# Patient Record
Sex: MALE | Race: OTHER | NOT HISPANIC OR LATINO | URBAN - METROPOLITAN AREA
[De-identification: names, ages, dates, MRNs, and addresses within clinical notes are randomized per-mention and may not be internally consistent; named-entity substitution may affect disease eponyms.]

---

## 2018-12-11 ENCOUNTER — EMERGENCY (EMERGENCY)
Facility: HOSPITAL | Age: 38
LOS: 1 days | Discharge: HOME | End: 2018-12-11
Attending: EMERGENCY MEDICINE | Admitting: EMERGENCY MEDICINE
Payer: SELF-PAY

## 2018-12-11 VITALS
TEMPERATURE: 98 F | RESPIRATION RATE: 18 BRPM | HEART RATE: 72 BPM | OXYGEN SATURATION: 100 % | SYSTOLIC BLOOD PRESSURE: 164 MMHG | DIASTOLIC BLOOD PRESSURE: 75 MMHG

## 2018-12-11 DIAGNOSIS — R00.2 PALPITATIONS: ICD-10-CM

## 2018-12-11 DIAGNOSIS — R07.89 OTHER CHEST PAIN: ICD-10-CM

## 2018-12-11 PROCEDURE — 99053 MED SERV 10PM-8AM 24 HR FAC: CPT

## 2018-12-11 PROCEDURE — 99285 EMERGENCY DEPT VISIT HI MDM: CPT | Mod: 25

## 2018-12-11 NOTE — ED ADULT NURSE NOTE - OBJECTIVE STATEMENT
39 y/o male c/o palpitations and left chest pain. Pt denies SOB. pt reports pain and numbness radiating down left arm. Pt denies N/V or dizziness. Pt speaks clear, MAEx4, has unlabored breathing. Abd soft nt nd. Skin dry, warm.

## 2018-12-11 NOTE — ED ADULT NURSE NOTE - CHPI ED NUR SYMPTOMS NEG
no congestion/no diaphoresis/no nausea/no fever/no back pain/no syncope/no shortness of breath/no vomiting

## 2018-12-12 VITALS
RESPIRATION RATE: 16 BRPM | DIASTOLIC BLOOD PRESSURE: 82 MMHG | TEMPERATURE: 98 F | HEART RATE: 80 BPM | SYSTOLIC BLOOD PRESSURE: 142 MMHG | OXYGEN SATURATION: 99 %

## 2018-12-12 PROCEDURE — 83735 ASSAY OF MAGNESIUM: CPT

## 2018-12-12 PROCEDURE — 36415 COLL VENOUS BLD VENIPUNCTURE: CPT

## 2018-12-12 PROCEDURE — 99283 EMERGENCY DEPT VISIT LOW MDM: CPT

## 2018-12-12 PROCEDURE — 85610 PROTHROMBIN TIME: CPT

## 2018-12-12 PROCEDURE — 85730 THROMBOPLASTIN TIME PARTIAL: CPT

## 2018-12-12 PROCEDURE — 82553 CREATINE MB FRACTION: CPT

## 2018-12-12 PROCEDURE — 84484 ASSAY OF TROPONIN QUANT: CPT

## 2018-12-12 PROCEDURE — 80053 COMPREHEN METABOLIC PANEL: CPT

## 2018-12-12 PROCEDURE — 71046 X-RAY EXAM CHEST 2 VIEWS: CPT | Mod: 26

## 2018-12-12 PROCEDURE — 71046 X-RAY EXAM CHEST 2 VIEWS: CPT

## 2018-12-12 PROCEDURE — 83690 ASSAY OF LIPASE: CPT

## 2018-12-12 PROCEDURE — 82550 ASSAY OF CK (CPK): CPT

## 2018-12-12 PROCEDURE — 85025 COMPLETE CBC W/AUTO DIFF WBC: CPT

## 2018-12-12 NOTE — ED PROVIDER NOTE - OBJECTIVE STATEMENT
2 days intermittent palpitations and some CP reported no fever no SOB No meds Hx 2 days intermittent palpitations and some CP though primarily palpitations espoused with no reported fever no SOB No meds Hx

## 2018-12-12 NOTE — ED PROVIDER NOTE - MEDICAL DECISION MAKING DETAILS
counseled on cxr/ labs / ekg and shared decision making towards advanced imaging counseled on cxr/ labs / ekg and follow up outpatient for further evaluations and care

## 2018-12-12 NOTE — ED PROVIDER NOTE - ATTENDING CONTRIBUTION TO CARE
I have seen & examined the pt, reviewed all pertinent clinical data, and I agree with the documentation/care/plan executed by DOUG Cat.

## 2019-01-03 ENCOUNTER — EMERGENCY (EMERGENCY)
Facility: HOSPITAL | Age: 39
LOS: 1 days | Discharge: HOME | End: 2019-01-03
Attending: EMERGENCY MEDICINE | Admitting: EMERGENCY MEDICINE
Payer: SELF-PAY

## 2019-01-03 VITALS
WEIGHT: 188.05 LBS | RESPIRATION RATE: 20 BRPM | TEMPERATURE: 99 F | OXYGEN SATURATION: 99 % | SYSTOLIC BLOOD PRESSURE: 162 MMHG | DIASTOLIC BLOOD PRESSURE: 87 MMHG | HEART RATE: 85 BPM

## 2019-01-03 DIAGNOSIS — R00.2 PALPITATIONS: ICD-10-CM

## 2019-01-03 DIAGNOSIS — R06.02 SHORTNESS OF BREATH: ICD-10-CM

## 2019-01-03 DIAGNOSIS — R07.89 OTHER CHEST PAIN: ICD-10-CM

## 2019-01-03 LAB
ALBUMIN SERPL ELPH-MCNC: 4.3 G/DL — SIGNIFICANT CHANGE UP (ref 3.3–5)
ALP SERPL-CCNC: 51 U/L — SIGNIFICANT CHANGE UP (ref 40–120)
ALT FLD-CCNC: 26 U/L — SIGNIFICANT CHANGE UP (ref 10–45)
ANION GAP SERPL CALC-SCNC: 11 MMOL/L — SIGNIFICANT CHANGE UP (ref 5–17)
AST SERPL-CCNC: 26 U/L — SIGNIFICANT CHANGE UP (ref 10–40)
BASOPHILS NFR BLD AUTO: 0.2 % — SIGNIFICANT CHANGE UP (ref 0–2)
BILIRUB SERPL-MCNC: 0.4 MG/DL — SIGNIFICANT CHANGE UP (ref 0.2–1.2)
BUN SERPL-MCNC: 11 MG/DL — SIGNIFICANT CHANGE UP (ref 7–23)
CALCIUM SERPL-MCNC: 9.4 MG/DL — SIGNIFICANT CHANGE UP (ref 8.4–10.5)
CHLORIDE SERPL-SCNC: 104 MMOL/L — SIGNIFICANT CHANGE UP (ref 96–108)
CK MB CFR SERPL CALC: 1 NG/ML — SIGNIFICANT CHANGE UP (ref 0–6.7)
CK SERPL-CCNC: 131 U/L — SIGNIFICANT CHANGE UP (ref 30–200)
CO2 SERPL-SCNC: 28 MMOL/L — SIGNIFICANT CHANGE UP (ref 22–31)
CREAT SERPL-MCNC: 0.83 MG/DL — SIGNIFICANT CHANGE UP (ref 0.5–1.3)
D DIMER BLD IA.RAPID-MCNC: <150 NG/ML DDU — SIGNIFICANT CHANGE UP
EOSINOPHIL NFR BLD AUTO: 1.7 % — SIGNIFICANT CHANGE UP (ref 0–6)
GLUCOSE SERPL-MCNC: 107 MG/DL — HIGH (ref 70–99)
HCT VFR BLD CALC: 42.9 % — SIGNIFICANT CHANGE UP (ref 39–50)
HGB BLD-MCNC: 14.1 G/DL — SIGNIFICANT CHANGE UP (ref 13–17)
LIDOCAIN IGE QN: 62 U/L — HIGH (ref 7–60)
LYMPHOCYTES # BLD AUTO: 28.9 % — SIGNIFICANT CHANGE UP (ref 13–44)
MAGNESIUM SERPL-MCNC: 2 MG/DL — SIGNIFICANT CHANGE UP (ref 1.6–2.6)
MCHC RBC-ENTMCNC: 31.5 PG — SIGNIFICANT CHANGE UP (ref 27–34)
MCHC RBC-ENTMCNC: 32.9 G/DL — SIGNIFICANT CHANGE UP (ref 32–36)
MCV RBC AUTO: 95.8 FL — SIGNIFICANT CHANGE UP (ref 80–100)
MONOCYTES NFR BLD AUTO: 8 % — SIGNIFICANT CHANGE UP (ref 2–14)
NEUTROPHILS NFR BLD AUTO: 61.2 % — SIGNIFICANT CHANGE UP (ref 43–77)
PLATELET # BLD AUTO: 194 K/UL — SIGNIFICANT CHANGE UP (ref 150–400)
POTASSIUM SERPL-MCNC: 4.1 MMOL/L — SIGNIFICANT CHANGE UP (ref 3.5–5.3)
POTASSIUM SERPL-SCNC: 4.1 MMOL/L — SIGNIFICANT CHANGE UP (ref 3.5–5.3)
PROT SERPL-MCNC: 7.7 G/DL — SIGNIFICANT CHANGE UP (ref 6–8.3)
RBC # BLD: 4.48 M/UL — SIGNIFICANT CHANGE UP (ref 4.2–5.8)
RBC # FLD: 12.8 % — SIGNIFICANT CHANGE UP (ref 10.3–16.9)
SODIUM SERPL-SCNC: 143 MMOL/L — SIGNIFICANT CHANGE UP (ref 135–145)
TROPONIN T SERPL-MCNC: <0.01 NG/ML — SIGNIFICANT CHANGE UP (ref 0–0.01)
WBC # BLD: 6.4 K/UL — SIGNIFICANT CHANGE UP (ref 3.8–10.5)
WBC # FLD AUTO: 6.4 K/UL — SIGNIFICANT CHANGE UP (ref 3.8–10.5)

## 2019-01-03 PROCEDURE — 99284 EMERGENCY DEPT VISIT MOD MDM: CPT | Mod: 25

## 2019-01-03 PROCEDURE — 93010 ELECTROCARDIOGRAM REPORT: CPT

## 2019-01-03 NOTE — ED PROVIDER NOTE - OBJECTIVE STATEMENT
reported 1 week palpitations chest pain and sob + hx similar symptoms in past albeit no w/u to date Denies fever

## 2019-01-03 NOTE — ED ADULT NURSE NOTE - NSIMPLEMENTINTERV_GEN_ALL_ED
Implemented All Universal Safety Interventions:  [place] to call system. Call bell, personal items and telephone within reach. Instruct patient to call for assistance. Room bathroom lighting operational. Non-slip footwear when patient is off stretcher. Physically safe environment: no spills, clutter or unnecessary equipment. Stretcher in lowest position, wheels locked, appropriate side rails in place.

## 2019-01-03 NOTE — ED PROVIDER NOTE - ATTENDING CONTRIBUTION TO CARE
38  yom pw 1 wk of cp w/ palpitation.  has had similar sx in the past.  no abd pain, no syncope.    agree w/ PA, nontoxic appearing, no tachypnea, no resp distress, will check labs, dimer for low risk PE eval, low suspicion for acs

## 2019-01-03 NOTE — ED PROVIDER NOTE - CARE PROVIDER_API CALL
Eb Wu), Cardiovascular Disease; Internal Medicine  [address] [facility]  [address] [facility] [place] [place] [address]  Phone: [phone]  Fax: [phone]

## 2019-01-04 PROCEDURE — 82550 ASSAY OF CK (CPK): CPT

## 2019-01-04 PROCEDURE — 83690 ASSAY OF LIPASE: CPT

## 2019-01-04 PROCEDURE — 36415 COLL VENOUS BLD VENIPUNCTURE: CPT

## 2019-01-04 PROCEDURE — 94640 AIRWAY INHALATION TREATMENT: CPT

## 2019-01-04 PROCEDURE — 82553 CREATINE MB FRACTION: CPT

## 2019-01-04 PROCEDURE — 85379 FIBRIN DEGRADATION QUANT: CPT

## 2019-01-04 PROCEDURE — 80053 COMPREHEN METABOLIC PANEL: CPT

## 2019-01-04 PROCEDURE — 99283 EMERGENCY DEPT VISIT LOW MDM: CPT | Mod: 25

## 2019-01-04 PROCEDURE — 83735 ASSAY OF MAGNESIUM: CPT

## 2019-01-04 PROCEDURE — 93005 ELECTROCARDIOGRAM TRACING: CPT

## 2019-01-04 PROCEDURE — 85025 COMPLETE CBC W/AUTO DIFF WBC: CPT

## 2019-01-04 PROCEDURE — 84484 ASSAY OF TROPONIN QUANT: CPT

## 2019-01-04 RX ORDER — ALBUTEROL 90 UG/1
1 AEROSOL, METERED ORAL ONCE
Qty: 0 | Refills: 0 | Status: COMPLETED | OUTPATIENT
Start: 2019-01-04 | End: 2019-01-04

## 2019-01-04 RX ADMIN — ALBUTEROL 1 PUFF(S): 90 AEROSOL, METERED ORAL at 00:05

## 2019-02-05 NOTE — ED ADULT TRIAGE NOTE - WEIGHT IN KG
34 85.3

## 2019-07-25 ENCOUNTER — EMERGENCY (EMERGENCY)
Facility: HOSPITAL | Age: 39
LOS: 1 days | Discharge: HOME | End: 2019-07-25
Attending: EMERGENCY MEDICINE | Admitting: EMERGENCY MEDICINE
Payer: SELF-PAY

## 2019-07-25 VITALS
SYSTOLIC BLOOD PRESSURE: 132 MMHG | WEIGHT: 188.05 LBS | RESPIRATION RATE: 19 BRPM | DIASTOLIC BLOOD PRESSURE: 83 MMHG | OXYGEN SATURATION: 99 % | HEIGHT: 77 IN | TEMPERATURE: 98 F | HEART RATE: 69 BPM

## 2019-07-25 DIAGNOSIS — R42 DIZZINESS AND GIDDINESS: ICD-10-CM

## 2019-07-25 DIAGNOSIS — R11.0 NAUSEA: ICD-10-CM

## 2019-07-25 DIAGNOSIS — R51 HEADACHE: ICD-10-CM

## 2019-07-25 LAB
ALBUMIN SERPL ELPH-MCNC: 4.5 G/DL — SIGNIFICANT CHANGE UP (ref 3.3–5)
ALP SERPL-CCNC: 55 U/L — SIGNIFICANT CHANGE UP (ref 40–120)
ALT FLD-CCNC: 15 U/L — SIGNIFICANT CHANGE UP (ref 10–45)
ANION GAP SERPL CALC-SCNC: 9 MMOL/L — SIGNIFICANT CHANGE UP (ref 5–17)
AST SERPL-CCNC: 20 U/L — SIGNIFICANT CHANGE UP (ref 10–40)
BASOPHILS # BLD AUTO: 0.04 K/UL — SIGNIFICANT CHANGE UP (ref 0–0.2)
BASOPHILS NFR BLD AUTO: 0.6 % — SIGNIFICANT CHANGE UP (ref 0–2)
BILIRUB SERPL-MCNC: 0.3 MG/DL — SIGNIFICANT CHANGE UP (ref 0.2–1.2)
BUN SERPL-MCNC: 11 MG/DL — SIGNIFICANT CHANGE UP (ref 7–23)
CALCIUM SERPL-MCNC: 9.7 MG/DL — SIGNIFICANT CHANGE UP (ref 8.4–10.5)
CHLORIDE SERPL-SCNC: 106 MMOL/L — SIGNIFICANT CHANGE UP (ref 96–108)
CK MB CFR SERPL CALC: <1 NG/ML — SIGNIFICANT CHANGE UP (ref 0–6.7)
CK SERPL-CCNC: 108 U/L — SIGNIFICANT CHANGE UP (ref 30–200)
CO2 SERPL-SCNC: 29 MMOL/L — SIGNIFICANT CHANGE UP (ref 22–31)
CREAT SERPL-MCNC: 0.92 MG/DL — SIGNIFICANT CHANGE UP (ref 0.5–1.3)
EOSINOPHIL # BLD AUTO: 0.24 K/UL — SIGNIFICANT CHANGE UP (ref 0–0.5)
EOSINOPHIL NFR BLD AUTO: 3.3 % — SIGNIFICANT CHANGE UP (ref 0–6)
GLUCOSE SERPL-MCNC: 98 MG/DL — SIGNIFICANT CHANGE UP (ref 70–99)
HCT VFR BLD CALC: 48.2 % — SIGNIFICANT CHANGE UP (ref 39–50)
HGB BLD-MCNC: 15.8 G/DL — SIGNIFICANT CHANGE UP (ref 13–17)
IMM GRANULOCYTES NFR BLD AUTO: 0.3 % — SIGNIFICANT CHANGE UP (ref 0–1.5)
LYMPHOCYTES # BLD AUTO: 2.51 K/UL — SIGNIFICANT CHANGE UP (ref 1–3.3)
LYMPHOCYTES # BLD AUTO: 35 % — SIGNIFICANT CHANGE UP (ref 13–44)
MCHC RBC-ENTMCNC: 32.1 PG — SIGNIFICANT CHANGE UP (ref 27–34)
MCHC RBC-ENTMCNC: 32.8 GM/DL — SIGNIFICANT CHANGE UP (ref 32–36)
MCV RBC AUTO: 98 FL — SIGNIFICANT CHANGE UP (ref 80–100)
MONOCYTES # BLD AUTO: 0.43 K/UL — SIGNIFICANT CHANGE UP (ref 0–0.9)
MONOCYTES NFR BLD AUTO: 6 % — SIGNIFICANT CHANGE UP (ref 2–14)
NEUTROPHILS # BLD AUTO: 3.94 K/UL — SIGNIFICANT CHANGE UP (ref 1.8–7.4)
NEUTROPHILS NFR BLD AUTO: 54.8 % — SIGNIFICANT CHANGE UP (ref 43–77)
NRBC # BLD: 0 /100 WBCS — SIGNIFICANT CHANGE UP (ref 0–0)
PLATELET # BLD AUTO: 224 K/UL — SIGNIFICANT CHANGE UP (ref 150–400)
POTASSIUM SERPL-MCNC: 4.1 MMOL/L — SIGNIFICANT CHANGE UP (ref 3.5–5.3)
POTASSIUM SERPL-SCNC: 4.1 MMOL/L — SIGNIFICANT CHANGE UP (ref 3.5–5.3)
PROT SERPL-MCNC: 8 G/DL — SIGNIFICANT CHANGE UP (ref 6–8.3)
RBC # BLD: 4.92 M/UL — SIGNIFICANT CHANGE UP (ref 4.2–5.8)
RBC # FLD: 12.3 % — SIGNIFICANT CHANGE UP (ref 10.3–14.5)
SODIUM SERPL-SCNC: 144 MMOL/L — SIGNIFICANT CHANGE UP (ref 135–145)
TROPONIN T SERPL-MCNC: <0.01 NG/ML — SIGNIFICANT CHANGE UP (ref 0–0.01)
WBC # BLD: 7.18 K/UL — SIGNIFICANT CHANGE UP (ref 3.8–10.5)
WBC # FLD AUTO: 7.18 K/UL — SIGNIFICANT CHANGE UP (ref 3.8–10.5)

## 2019-07-25 PROCEDURE — 93005 ELECTROCARDIOGRAM TRACING: CPT

## 2019-07-25 PROCEDURE — 82550 ASSAY OF CK (CPK): CPT

## 2019-07-25 PROCEDURE — 80053 COMPREHEN METABOLIC PANEL: CPT

## 2019-07-25 PROCEDURE — 83735 ASSAY OF MAGNESIUM: CPT

## 2019-07-25 PROCEDURE — 36415 COLL VENOUS BLD VENIPUNCTURE: CPT

## 2019-07-25 PROCEDURE — 85025 COMPLETE CBC W/AUTO DIFF WBC: CPT

## 2019-07-25 PROCEDURE — 84100 ASSAY OF PHOSPHORUS: CPT

## 2019-07-25 PROCEDURE — 82553 CREATINE MB FRACTION: CPT

## 2019-07-25 PROCEDURE — 93010 ELECTROCARDIOGRAM REPORT: CPT

## 2019-07-25 PROCEDURE — 99283 EMERGENCY DEPT VISIT LOW MDM: CPT | Mod: 25

## 2019-07-25 PROCEDURE — 84484 ASSAY OF TROPONIN QUANT: CPT

## 2019-07-25 PROCEDURE — 99284 EMERGENCY DEPT VISIT MOD MDM: CPT

## 2019-07-25 RX ORDER — SODIUM CHLORIDE 9 MG/ML
2000 INJECTION INTRAMUSCULAR; INTRAVENOUS; SUBCUTANEOUS ONCE
Refills: 0 | Status: COMPLETED | OUTPATIENT
Start: 2019-07-25 | End: 2019-07-25

## 2019-07-25 RX ADMIN — SODIUM CHLORIDE 2000 MILLILITER(S): 9 INJECTION INTRAMUSCULAR; INTRAVENOUS; SUBCUTANEOUS at 23:37

## 2019-07-25 NOTE — ED ADULT NURSE NOTE - OBJECTIVE STATEMENT
Pt alert and oriented X3, Pt states he has been feeling dizzy intermittently along with SOB since Friday more prominent when he is performing any activities. Pt also reports feeling headaches intermittently along with "feeling pressure in my ears."

## 2019-07-25 NOTE — ED ADULT NURSE NOTE - NEURO WDL
Alert and oriented to person, place and time, memory intact, behavior appropriate to situation. See note above. No neuro deficits

## 2019-07-25 NOTE — ED PROVIDER NOTE - CLINICAL SUMMARY MEDICAL DECISION MAKING FREE TEXT BOX
pt w dizzyness, normal exam and reassuring vitals , HIGHEST suspician for dehydration / lightheadness as pt w dark yellow colored urine at bedside. Consider vertigo, pt with nl neuro exam, doubt intracranial pathology, plan to screen EKG , eval for metabolic abnormalities and hydrate, IF still dizzy/lightheaded after fluids will empirically try meclizine for possible vertigo.  dispo plan pending workup.

## 2019-07-25 NOTE — ED ADULT NURSE NOTE - CHPI ED NUR SYMPTOMS NEG
no blurred vision/no weakness/no nausea/no numbness/no confusion/no fever/no change in level of consciousness/no loss of consciousness/no vomiting

## 2019-07-25 NOTE — ED PROVIDER NOTE - OBJECTIVE STATEMENT
39 yo with ho of asthma, reports 5 days ago , on the day it was in the 90's, reports he felt lightheaded and nauseaus , had an episode of diaphoresis w the sensation he might faint, had skipped breakfast that morning and reports he wasn't drinking much water,  later developed gradual onset headache, reports headache lasted for 24 hrs , since event pt reports he has felt dizzyness , similar to after a prior concussion, no persistent headaches, no fever, no neck pain, no CP no SOB, no palpitations,

## 2019-07-26 VITALS
RESPIRATION RATE: 18 BRPM | SYSTOLIC BLOOD PRESSURE: 119 MMHG | OXYGEN SATURATION: 98 % | HEART RATE: 72 BPM | DIASTOLIC BLOOD PRESSURE: 76 MMHG

## 2019-07-26 PROBLEM — J45.909 UNSPECIFIED ASTHMA, UNCOMPLICATED: Chronic | Status: ACTIVE | Noted: 2019-01-03

## 2020-04-20 ENCOUNTER — EMERGENCY (EMERGENCY)
Facility: HOSPITAL | Age: 40
LOS: 1 days | Discharge: HOME | End: 2020-04-20
Attending: EMERGENCY MEDICINE | Admitting: EMERGENCY MEDICINE
Payer: MEDICARE

## 2020-04-20 VITALS
RESPIRATION RATE: 18 BRPM | OXYGEN SATURATION: 99 % | SYSTOLIC BLOOD PRESSURE: 134 MMHG | HEART RATE: 90 BPM | DIASTOLIC BLOOD PRESSURE: 74 MMHG

## 2020-04-20 VITALS
DIASTOLIC BLOOD PRESSURE: 86 MMHG | WEIGHT: 184.97 LBS | OXYGEN SATURATION: 100 % | SYSTOLIC BLOOD PRESSURE: 145 MMHG | HEART RATE: 110 BPM | RESPIRATION RATE: 18 BRPM | TEMPERATURE: 98 F | HEIGHT: 77 IN

## 2020-04-20 LAB
ALBUMIN SERPL ELPH-MCNC: 4.1 G/DL — SIGNIFICANT CHANGE UP (ref 3.3–5)
ALP SERPL-CCNC: 49 U/L — SIGNIFICANT CHANGE UP (ref 40–120)
ALT FLD-CCNC: 12 U/L — SIGNIFICANT CHANGE UP (ref 10–45)
ANION GAP SERPL CALC-SCNC: 12 MMOL/L — SIGNIFICANT CHANGE UP (ref 5–17)
APTT BLD: 30.5 SEC — SIGNIFICANT CHANGE UP (ref 27.5–36.3)
AST SERPL-CCNC: 14 U/L — SIGNIFICANT CHANGE UP (ref 10–40)
BASOPHILS # BLD AUTO: 0.02 K/UL — SIGNIFICANT CHANGE UP (ref 0–0.2)
BASOPHILS NFR BLD AUTO: 0.2 % — SIGNIFICANT CHANGE UP (ref 0–2)
BILIRUB SERPL-MCNC: 0.5 MG/DL — SIGNIFICANT CHANGE UP (ref 0.2–1.2)
BUN SERPL-MCNC: 7 MG/DL — SIGNIFICANT CHANGE UP (ref 7–23)
CALCIUM SERPL-MCNC: 9.4 MG/DL — SIGNIFICANT CHANGE UP (ref 8.4–10.5)
CHLORIDE SERPL-SCNC: 103 MMOL/L — SIGNIFICANT CHANGE UP (ref 96–108)
CO2 SERPL-SCNC: 28 MMOL/L — SIGNIFICANT CHANGE UP (ref 22–31)
CREAT SERPL-MCNC: 0.77 MG/DL — SIGNIFICANT CHANGE UP (ref 0.5–1.3)
EOSINOPHIL # BLD AUTO: 0.01 K/UL — SIGNIFICANT CHANGE UP (ref 0–0.5)
EOSINOPHIL NFR BLD AUTO: 0.1 % — SIGNIFICANT CHANGE UP (ref 0–6)
GLUCOSE SERPL-MCNC: 107 MG/DL — HIGH (ref 70–99)
HCT VFR BLD CALC: 42.7 % — SIGNIFICANT CHANGE UP (ref 39–50)
HGB BLD-MCNC: 14.1 G/DL — SIGNIFICANT CHANGE UP (ref 13–17)
IMM GRANULOCYTES NFR BLD AUTO: 0.2 % — SIGNIFICANT CHANGE UP (ref 0–1.5)
INR BLD: 1.1 — SIGNIFICANT CHANGE UP (ref 0.88–1.16)
LYMPHOCYTES # BLD AUTO: 2.68 K/UL — SIGNIFICANT CHANGE UP (ref 1–3.3)
LYMPHOCYTES # BLD AUTO: 31 % — SIGNIFICANT CHANGE UP (ref 13–44)
MCHC RBC-ENTMCNC: 31.9 PG — SIGNIFICANT CHANGE UP (ref 27–34)
MCHC RBC-ENTMCNC: 33 GM/DL — SIGNIFICANT CHANGE UP (ref 32–36)
MCV RBC AUTO: 96.6 FL — SIGNIFICANT CHANGE UP (ref 80–100)
MONOCYTES # BLD AUTO: 0.52 K/UL — SIGNIFICANT CHANGE UP (ref 0–0.9)
MONOCYTES NFR BLD AUTO: 6 % — SIGNIFICANT CHANGE UP (ref 2–14)
NEUTROPHILS # BLD AUTO: 5.39 K/UL — SIGNIFICANT CHANGE UP (ref 1.8–7.4)
NEUTROPHILS NFR BLD AUTO: 62.5 % — SIGNIFICANT CHANGE UP (ref 43–77)
NRBC # BLD: 0 /100 WBCS — SIGNIFICANT CHANGE UP (ref 0–0)
PLATELET # BLD AUTO: 225 K/UL — SIGNIFICANT CHANGE UP (ref 150–400)
POTASSIUM SERPL-MCNC: 3 MMOL/L — LOW (ref 3.5–5.3)
POTASSIUM SERPL-SCNC: 3 MMOL/L — LOW (ref 3.5–5.3)
PROT SERPL-MCNC: 6.8 G/DL — SIGNIFICANT CHANGE UP (ref 6–8.3)
PROTHROM AB SERPL-ACNC: 12.6 SEC — SIGNIFICANT CHANGE UP (ref 10–12.9)
RBC # BLD: 4.42 M/UL — SIGNIFICANT CHANGE UP (ref 4.2–5.8)
RBC # FLD: 12.4 % — SIGNIFICANT CHANGE UP (ref 10.3–14.5)
SARS-COV-2 RNA SPEC QL NAA+PROBE: SIGNIFICANT CHANGE UP
SODIUM SERPL-SCNC: 143 MMOL/L — SIGNIFICANT CHANGE UP (ref 135–145)
TROPONIN T SERPL-MCNC: <0.01 NG/ML — SIGNIFICANT CHANGE UP (ref 0–0.01)
WBC # BLD: 8.64 K/UL — SIGNIFICANT CHANGE UP (ref 3.8–10.5)
WBC # FLD AUTO: 8.64 K/UL — SIGNIFICANT CHANGE UP (ref 3.8–10.5)

## 2020-04-20 PROCEDURE — 85025 COMPLETE CBC W/AUTO DIFF WBC: CPT

## 2020-04-20 PROCEDURE — 84484 ASSAY OF TROPONIN QUANT: CPT

## 2020-04-20 PROCEDURE — 71045 X-RAY EXAM CHEST 1 VIEW: CPT | Mod: 26

## 2020-04-20 PROCEDURE — 83880 ASSAY OF NATRIURETIC PEPTIDE: CPT

## 2020-04-20 PROCEDURE — 71045 X-RAY EXAM CHEST 1 VIEW: CPT

## 2020-04-20 PROCEDURE — 80053 COMPREHEN METABOLIC PANEL: CPT

## 2020-04-20 PROCEDURE — 93005 ELECTROCARDIOGRAM TRACING: CPT

## 2020-04-20 PROCEDURE — 93010 ELECTROCARDIOGRAM REPORT: CPT

## 2020-04-20 PROCEDURE — 85610 PROTHROMBIN TIME: CPT

## 2020-04-20 PROCEDURE — 87635 SARS-COV-2 COVID-19 AMP PRB: CPT

## 2020-04-20 PROCEDURE — 36415 COLL VENOUS BLD VENIPUNCTURE: CPT

## 2020-04-20 PROCEDURE — 99285 EMERGENCY DEPT VISIT HI MDM: CPT

## 2020-04-20 PROCEDURE — 99283 EMERGENCY DEPT VISIT LOW MDM: CPT | Mod: 25

## 2020-04-20 PROCEDURE — 85730 THROMBOPLASTIN TIME PARTIAL: CPT

## 2020-04-20 PROCEDURE — 85379 FIBRIN DEGRADATION QUANT: CPT

## 2020-04-20 RX ORDER — POTASSIUM CHLORIDE 20 MEQ
40 PACKET (EA) ORAL ONCE
Refills: 0 | Status: COMPLETED | OUTPATIENT
Start: 2020-04-20 | End: 2020-04-20

## 2020-04-20 RX ADMIN — Medication 40 MILLIEQUIVALENT(S): at 14:19

## 2020-04-20 NOTE — ED PROVIDER NOTE - ATTENDING CONTRIBUTION TO CARE
I discussed the plan of care of the patient directly with the PA and examined the patient while in the Emergency Department. I agree with the HPI and PE as documented by the PA.  Pt is a 38yo m, no pmh, who p/w 5 wks of intermittent cp and sob. CP described as aching to mid chest, non-exertional, better when lying on sides, non-pleuritic. No leg pain, swelling. No cough/ congestion/ fever/ chills. No abd pain, vomiting/ diarrhea. Pt was seen at [facility] 2 wks ago and told to have abnl ekg. Afebrile. HDS. Thin, AA m in nad. + s1,s2, rrr. Lungs cta b/l. Abd soft, nt/nd. No leg edema. No calf tenderness/ cords.2+ pulses b/l. EKG non-ischemic in ed. Plan for labs including cardiac enzymes and d-dimer, cxr. Will continue to monitor.

## 2020-04-20 NOTE — ED PROVIDER NOTE - CLINICAL SUMMARY MEDICAL DECISION MAKING FREE TEXT BOX
cp and sob x 5 wks. pt well appearing. labs noted. cxr no acute pathology. covid negative. troponin, d dimer and bnp negative. pt with similar visits in past. will refer to cardiology.

## 2020-04-20 NOTE — ED PROVIDER NOTE - NSFOLLOWUPINSTRUCTIONS_ED_ALL_ED_FT
Follow up with your cardiology this week.       Chest Pain    WHAT YOU NEED TO KNOW:    Chest pain can be caused by a range of conditions, from not serious to life-threatening. Chest pain can be a symptom of a digestive problem, such as acid reflux or a stomach ulcer. An anxiety attack or a strong emotion, such as anger, can also cause chest pain. Infection, inflammation, or a fracture in the bones or cartilage in your chest can cause pain or discomfort. Sometimes chest pain or pressure is caused by poor blood flow to your heart (angina). Chest pain may also be caused by life-threatening conditions such as a heart attack or blood clot in your lungs.     DISCHARGE INSTRUCTIONS:    Call 911 if:     You have any of the following signs of a heart attack:   Squeezing, pressure, or pain in your chest      You may also have any of the following:   Discomfort or pain in your back, neck, jaw, stomach, or arm      Shortness of breath      Nausea or vomiting      Lightheadedness or a sudden cold sweat        Return to the emergency department if:     You have chest discomfort that gets worse, even with medicine.      You cough or vomit blood.       Your bowel movements are black or bloody.       You cannot stop vomiting, or it hurts to swallow.     Contact your healthcare provider if:     You have questions or concerns about your condition or care.        Medicines:     Medicines may be given to treat the cause of your chest pain. Examples include pain medicine, anxiety medicine, or medicines to increase blood flow to your heart.       Do not take certain medicines without asking your healthcare provider first. These include NSAIDs, herbal or vitamin supplements, or hormones (estrogen or progestin).       Take your medicine as directed. Contact your healthcare provider if you think your medicine is not helping or if you have side effects. Tell him or her if you are allergic to any medicine. Keep a list of the medicines, vitamins, and herbs you take. Include the amounts, and when and why you take them. Bring the list or the pill bottles to follow-up visits. Carry your medicine list with you in case of an emergency.    Follow up with your healthcare provider within 72 hours, or as directed: You may need to return for more tests to find the cause of your chest pain. You may be referred to a specialist, such as a cardiologist or gastroenterologist. Write down your questions so you remember to ask them during your visits.     Healthy living tips: The following are general healthy guidelines. If your chest pain is caused by a heart problem, your healthcare provider will give you specific guidelines to follow.    Do not smoke. Nicotine and other chemicals in cigarettes and cigars can cause lung and heart damage. Ask your healthcare provider for information if you currently smoke and need help to quit. E-cigarettes or smokeless tobacco still contain nicotine. Talk to your healthcare provider before you use these products.       Eat a variety of healthy, low-fat, low-salt foods. Healthy foods include fruits, vegetables, whole-grain breads, low-fat dairy products, beans, lean meats, and fish. Ask for more information about a heart healthy diet.      Drink plenty of water every day. Your body is made of mostly water. Water helps your body to control your temperature and blood pressure. Ask your healthcare provider how much water you should drink every day.      Ask about activity. Your healthcare provider will tell you which activities to limit or avoid. Ask when you can drive, return to work, and have sex. Ask about the best exercise plan for you.      Maintain a healthy weight. Ask your healthcare provider how much you should weigh. Ask him or her to help you create a weight loss plan if you are overweight.       Get the flu and pneumonia vaccines. All adults should get the influenza (flu) vaccine. Get it every year as soon as it becomes available. The pneumococcal vaccine is given to adults aged 65 years or older. The vaccine is given every 5 years to prevent pneumococcal disease, such as pneumonia.    If you have a stent:     Carry your stent card with you at all times.       Let all healthcare providers know that you have a stent.          © Copyright [employer] 2020       back to top                      © Copyright [employer] 2020

## 2020-04-20 NOTE — ED ADULT NURSE NOTE - OBJECTIVE STATEMENT
39 y.o M a&ox4 walked in from triage c.o CP. Pt reports x 5 weeks of chest congestion, headaches, dizziness and SOB w/ exertion. SOB worsening when lying down at night. no swelling to lower extremities.  went to urgent care x 32 weeks ago and had an abnormal EKG.  came in today due to inability to sleep last night due to SOB. speaking in clear appropriate sentences airway patent. no signs of resp distress. 100% on room air. EKG complete. PMH of asthma , reports no relief after inhaler use. denies fevers, chills, body aches, diarrhea, loss of taste.

## 2020-04-20 NOTE — ED ADULT TRIAGE NOTE - CHIEF COMPLAINT QUOTE
c/o chest pain and sob x 1 month. states he had an abnormal ekg 2 weeks ago. presents today because he thought it would get better but it hasn't. endorses difficulty breathing worse with exertion or laying down. also states he had exposure to covid19 5 weeks ago but unsure if he had symptoms.

## 2020-04-20 NOTE — ED PROVIDER NOTE - CARE PROVIDER_API CALL
Angela Rodriguez)  Cardiovascular Disease; Internal Medicine  [address] [place] [address]  Phone: [phone]  Fax: [phone]  Follow Up Time: 4-6 Days

## 2020-04-20 NOTE — ED PROVIDER NOTE - PATIENT PORTAL LINK FT
You can access the FollowMyHealth Patient Portal offered by [facility] by registering at the following website: [email or web address]. By joining [employer]’s FollowMyHealth portal, you will also be able to view your health information using other applications (apps) compatible with our system.

## 2020-04-20 NOTE — ED PROVIDER NOTE - OBJECTIVE STATEMENT
38 y/o male with no sig PMHx c/o cp and sob x 5 wks. Pt states sob worse with lying flat. Pt reports intermittent dull achy pain to left chest. pt states seen at urgent care a few days ago and told to come to ED as his ECG showed " strain to left heart". Also pt notes left lower back pain and concerned may be his "kidney". no urinary sx's. no testicular pain. no leg pain or swelling. no ha or dizziness.  no abd pain, n/v. no further complaints.

## 2020-04-24 DIAGNOSIS — R07.89 OTHER CHEST PAIN: ICD-10-CM

## 2020-04-24 DIAGNOSIS — Z20.828 CONTACT WITH AND (SUSPECTED) EXPOSURE TO OTHER VIRAL COMMUNICABLE DISEASES: ICD-10-CM

## 2020-04-24 DIAGNOSIS — R06.02 SHORTNESS OF BREATH: ICD-10-CM

## 2020-05-07 ENCOUNTER — TRANSCRIPTION ENCOUNTER (OUTPATIENT)
Age: 40
End: 2020-05-07

## 2020-05-07 ENCOUNTER — APPOINTMENT (OUTPATIENT)
Dept: HEART AND VASCULAR | Facility: CLINIC | Age: 40
End: 2020-05-07
Payer: MEDICAID

## 2020-05-07 VITALS
DIASTOLIC BLOOD PRESSURE: 80 MMHG | WEIGHT: 184.99 LBS | OXYGEN SATURATION: 98 % | HEIGHT: 77.17 IN | TEMPERATURE: 97.9 F | BODY MASS INDEX: 21.84 KG/M2 | SYSTOLIC BLOOD PRESSURE: 110 MMHG | HEART RATE: 72 BPM

## 2020-05-07 DIAGNOSIS — F17.200 NICOTINE DEPENDENCE, UNSPECIFIED, UNCOMPLICATED: ICD-10-CM

## 2020-05-07 DIAGNOSIS — Z82.49 FAMILY HISTORY OF ISCHEMIC HEART DISEASE AND OTHER DISEASES OF THE CIRCULATORY SYSTEM: ICD-10-CM

## 2020-05-07 PROCEDURE — 99205 OFFICE O/P NEW HI 60 MIN: CPT

## 2020-05-07 PROCEDURE — 93306 TTE W/DOPPLER COMPLETE: CPT

## 2020-05-07 PROCEDURE — 93000 ELECTROCARDIOGRAM COMPLETE: CPT

## 2020-05-07 RX ORDER — CETIRIZINE HCL 10 MG
TABLET ORAL
Refills: 0 | Status: ACTIVE | COMMUNITY

## 2020-05-07 RX ORDER — ALBUTEROL SULFATE 90 UG/1
108 (90 BASE) INHALANT RESPIRATORY (INHALATION)
Refills: 0 | Status: ACTIVE | COMMUNITY

## 2020-05-07 NOTE — DISCUSSION/SUMMARY
[ECG Normal Variant] : ECG normal variant [Coronary Artery Disease] : coronary artery disease [Serum Electrolytes] : serum electrolytes [Possible Cardiac Ischemia (Intermd Prob)] : possible cardiac ischemia (intermediate probability) [Deteriorating] : deteriorating [Not Responding to Treatment] : not responding to treatment [Multidetector Cardiac CT] : multidetector cardiac CT [Hypertension] : hypertension [Stable] : stable [Outpatient Evaluation] : outpatient evaluation [Basic Metabolic Panel] : basic metabolic panel [Ambulatory BP Monitoring] : ambulatory blood pressure monitoring [ECG] : ECG [Chest CT] : chest CT [Echocardiogram] : echocardiogram [Medication Changes Per Orders] : as documented in orders [Electron Beam CT] : electron beam CT [FreeTextEntry1] : \par The following procedures have been personally reviewed and interpreted:\par \par EKG - NSR Q in leave V1, V2, LVH by voltage criteria, non specific st changes\par \par

## 2020-05-07 NOTE — PHYSICAL EXAM
[General Appearance - Well Developed] : well developed [Normal Appearance] : normal appearance [General Appearance - Well Nourished] : well nourished [Well Groomed] : well groomed [No Deformities] : no deformities [General Appearance - In No Acute Distress] : no acute distress [Eyelids - No Xanthelasma] : the eyelids demonstrated no xanthelasmas [Normal Conjunctiva] : the conjunctiva exhibited no abnormalities [Normal Oral Mucosa] : normal oral mucosa [No Oral Pallor] : no oral pallor [Normal Jugular Venous A Waves Present] : normal jugular venous A waves present [No Oral Cyanosis] : no oral cyanosis [Normal Jugular Venous V Waves Present] : normal jugular venous V waves present [No Jugular Venous De La Cruz A Waves] : no jugular venous de la cruz A waves [Heart Rate And Rhythm] : heart rate and rhythm were normal [Heart Sounds] : normal S1 and S2 [Murmurs] : no murmurs present [Respiration, Rhythm And Depth] : normal respiratory rhythm and effort [Exaggerated Use Of Accessory Muscles For Inspiration] : no accessory muscle use [Abdomen Soft] : soft [Auscultation Breath Sounds / Voice Sounds] : lungs were clear to auscultation bilaterally [Abdomen Tenderness] : non-tender [Abdomen Mass (___ Cm)] : no abdominal mass palpated [Abnormal Walk] : normal gait [Gait - Sufficient For Exercise Testing] : the gait was sufficient for exercise testing [Nail Clubbing] : no clubbing of the fingernails [Cyanosis, Localized] : no localized cyanosis [Petechial Hemorrhages (___cm)] : no petechial hemorrhages [] : no rash [Skin Color & Pigmentation] : normal skin color and pigmentation [No Venous Stasis] : no venous stasis [Skin Lesions] : no skin lesions [No Skin Ulcers] : no skin ulcer [No Xanthoma] : no  xanthoma was observed [Oriented To Time, Place, And Person] : oriented to person, place, and time [Affect] : the affect was normal [Mood] : the mood was normal [No Anxiety] : not feeling anxious

## 2020-05-07 NOTE — HISTORY OF PRESENT ILLNESS
[FreeTextEntry1] : \par 39 AA M with new onset sscp left sided, has pmh sig for seasonal asthma seeing Dr Mayberry, does not have pcp, [place]  \par SOB - no\par chest pain - yes\par location: chest\par duration: minutes\par modifying factors: pressing on the area\par timing: intermittent\par severity: 6/10

## 2020-05-08 ENCOUNTER — APPOINTMENT (OUTPATIENT)
Dept: PULMONOLOGY | Facility: CLINIC | Age: 40
End: 2020-05-08
Payer: MEDICAID

## 2020-05-08 VITALS — OXYGEN SATURATION: 97 % | BODY MASS INDEX: 21.73 KG/M2 | TEMPERATURE: 98.3 F | HEART RATE: 87 BPM | WEIGHT: 184 LBS

## 2020-05-08 LAB
ANION GAP SERPL CALC-SCNC: 12 MMOL/L
BUN SERPL-MCNC: 7 MG/DL
CALCIUM SERPL-MCNC: 9.4 MG/DL
CHLORIDE SERPL-SCNC: 104 MMOL/L
CHOLEST SERPL-MCNC: 159 MG/DL
CHOLEST/HDLC SERPL: 3 RATIO
CO2 SERPL-SCNC: 27 MMOL/L
CREAT SERPL-MCNC: 0.7 MG/DL
ESTIMATED AVERAGE GLUCOSE: 103 MG/DL
GLUCOSE SERPL-MCNC: 88 MG/DL
HBA1C MFR BLD HPLC: 5.2 %
HDLC SERPL-MCNC: 52 MG/DL
LDLC SERPL CALC-MCNC: 95 MG/DL
POTASSIUM SERPL-SCNC: 4.4 MMOL/L
SODIUM SERPL-SCNC: 143 MMOL/L
TRIGL SERPL-MCNC: 55 MG/DL

## 2020-05-08 PROCEDURE — 99203 OFFICE O/P NEW LOW 30 MIN: CPT

## 2020-05-11 NOTE — PROCEDURE
[FreeTextEntry1] : EXAM: XR CHEST PORTABLE URGENT 1V   PROCEDURE DATE: 04/20/2020     INTERPRETATION: PORTABLE CHEST X-RAY   HISTORY: cp and sob   PRIOR STUDIES: 12/12/2018   FINDINGS: The lungs are clear. There are no pleural effusions. No  pneumothorax. The cardiomediastinal silhouette is unremarkable. Mild  thoracic dextroscoliosis.   IMPRESSION: The lungs are clear.

## 2020-05-11 NOTE — REVIEW OF SYSTEMS
[Chest Tightness] : chest tightness [Wheezing] : wheezing [SOB on Exertion] : sob on exertion [Negative] : Endocrine

## 2020-05-11 NOTE — ASSESSMENT
[FreeTextEntry1] : Asthma  Reviewed CXR clear lungs and lab work from hospital WN.  Negative D. dimer.  Pt improved with systemic  steroids.   He is currently using only albuterol.  [place] flow today is 600.  His lung exam is unremarkable and without desaturation.    Plan - Follow up with cardiology for CTA of heart - Follow up in 2 months for spirometry unable to do today due to COVID.

## 2020-05-11 NOTE — ASSESSMENT
[FreeTextEntry1] : Asthma  Reviewed CXR clear lungs and lab work from hospital WN.  Negative D. dimer.  Pt improved with systemic  steroids.   He is currently using only albuterol.  [place] flow today is 600.  His lung exam is unremarkable and without desaturation.    Plan - Follow up with cardiology for CTA of heart - Follow up in 2 months for spirometry unable to do today due to COVID.

## 2020-05-11 NOTE — HISTORY OF PRESENT ILLNESS
[TextBox_4] : 39 year old male with PMH of asthma. Asthma is exacerbated by sickness.  He has not been on inhalers for the past 20 yrs.  He started on the albuterol when he got sick.  Non smoker but occasional hookah. \par \par He has been sick since mid March. He has been using his inhaler with some relief.  He is using albuterol nebulizer 1-2 times a day.  He is no longer coughing but has chest congestion.  He was seen by cardiology.  He was given prednisone for 5 days from urgent care and he felt improvement with his congestion by 40 %. He took Qvar once a day for 10 days without any change in symptoms. He was given z pack for 5 days finished 10 days ago improved to 60%.\par \par He continues to have chest congestion and disuse pain with rib pain. When he lays down a feels congestions.  He is taking zrytec which is helping with allergies. mild headache and dizziness.  He is wheezing at night when changes position off his back it improves.. He is walking 1-2 mile a day takes the nebulizer prior to walking. \par \par [place] flow 600

## 2020-05-14 ENCOUNTER — APPOINTMENT (OUTPATIENT)
Dept: HEART AND VASCULAR | Facility: CLINIC | Age: 40
End: 2020-05-14
Payer: MEDICAID

## 2020-05-14 DIAGNOSIS — R07.9 CHEST PAIN, UNSPECIFIED: ICD-10-CM

## 2020-05-14 DIAGNOSIS — R94.31 ABNORMAL ELECTROCARDIOGRAM [ECG] [EKG]: ICD-10-CM

## 2020-05-14 PROCEDURE — 99442: CPT

## 2020-05-21 ENCOUNTER — APPOINTMENT (OUTPATIENT)
Dept: CT IMAGING | Facility: CLINIC | Age: 40
End: 2020-05-21
Payer: MEDICAID

## 2020-05-21 ENCOUNTER — OUTPATIENT (OUTPATIENT)
Dept: OUTPATIENT SERVICES | Facility: HOSPITAL | Age: 40
LOS: 1 days | End: 2020-05-21

## 2020-05-21 PROCEDURE — 75574 CT ANGIO HRT W/3D IMAGE: CPT | Mod: 26

## 2020-06-26 ENCOUNTER — APPOINTMENT (OUTPATIENT)
Dept: INTERNAL MEDICINE | Facility: CLINIC | Age: 40
End: 2020-06-26

## 2020-08-17 ENCOUNTER — APPOINTMENT (OUTPATIENT)
Dept: PULMONOLOGY | Facility: CLINIC | Age: 40
End: 2020-08-17

## 2020-12-02 NOTE — ED ADULT NURSE NOTE - GASTROINTESTINAL WDL
Abdomen soft, nontender, nondistended, bowel sounds present in all 4 quadrants. Imiquimod Counseling:  I discussed with the patient the risks of imiquimod including but not limited to erythema, scaling, itching, weeping, crusting, and pain.  Patient understands that the inflammatory response to imiquimod is variable from person to person and was educated regarded proper titration schedule.  If flu-like symptoms develop, patient knows to discontinue the medication and contact us.

## 2021-02-26 NOTE — PROCEDURE
[FreeTextEntry1] : EXAM: XR CHEST PORTABLE URGENT 1V   PROCEDURE DATE: 04/20/2020     INTERPRETATION: PORTABLE CHEST X-RAY   HISTORY: cp and sob   PRIOR STUDIES: 12/12/2018   FINDINGS: The lungs are clear. There are no pleural effusions. No  pneumothorax. The cardiomediastinal silhouette is unremarkable. Mild  thoracic dextroscoliosis.   IMPRESSION: The lungs are clear.    No cyanosis, clubbing or edema

## 2021-08-23 ENCOUNTER — APPOINTMENT (OUTPATIENT)
Dept: INTERNAL MEDICINE | Facility: CLINIC | Age: 41
End: 2021-08-23
Payer: MEDICAID

## 2021-08-23 VITALS
DIASTOLIC BLOOD PRESSURE: 93 MMHG | TEMPERATURE: 98.1 F | OXYGEN SATURATION: 97 % | SYSTOLIC BLOOD PRESSURE: 133 MMHG | RESPIRATION RATE: 12 BRPM | HEART RATE: 82 BPM

## 2021-08-23 DIAGNOSIS — Z00.00 ENCOUNTER FOR GENERAL ADULT MEDICAL EXAMINATION W/OUT ABNORMAL FINDINGS: ICD-10-CM

## 2021-08-23 DIAGNOSIS — R07.81 PLEURODYNIA: ICD-10-CM

## 2021-08-23 PROCEDURE — 99204 OFFICE O/P NEW MOD 45 MIN: CPT | Mod: GC

## 2021-08-25 ENCOUNTER — APPOINTMENT (OUTPATIENT)
Dept: INTERNAL MEDICINE | Facility: CLINIC | Age: 41
End: 2021-08-25
Payer: MEDICAID

## 2021-08-25 VITALS
TEMPERATURE: 98.4 F | HEART RATE: 75 BPM | OXYGEN SATURATION: 98 % | HEIGHT: 77 IN | DIASTOLIC BLOOD PRESSURE: 87 MMHG | SYSTOLIC BLOOD PRESSURE: 142 MMHG | BODY MASS INDEX: 23.02 KG/M2 | RESPIRATION RATE: 16 BRPM | WEIGHT: 195 LBS

## 2021-08-25 DIAGNOSIS — M41.80 OTHER FORMS OF SCOLIOSIS, SITE UNSPECIFIED: ICD-10-CM

## 2021-08-25 DIAGNOSIS — S99.921A UNSPECIFIED INJURY OF RIGHT FOOT, INITIAL ENCOUNTER: ICD-10-CM

## 2021-08-25 DIAGNOSIS — M54.9 DORSALGIA, UNSPECIFIED: ICD-10-CM

## 2021-08-25 PROBLEM — Z00.00 ENCOUNTER FOR PREVENTIVE HEALTH EXAMINATION: Status: ACTIVE | Noted: 2020-05-06

## 2021-08-25 PROCEDURE — 99214 OFFICE O/P EST MOD 30 MIN: CPT | Mod: GC

## 2021-08-25 NOTE — ASSESSMENT
[FreeTextEntry1] : Patient is a 41 year old male with pmhx of asthma who presents for 1.5 year history of lumbar back pain and bilateral lower rib pain.\par \par #Back and rib pain\par -patient has associated shortness of breath with the back and rib pain, as he states the pain "restricts movement in his chest," which makes it difficult for him to breath\par -patient followed up with Pulm, who believed symptoms due to asthma. However, patient states inhalers do not help his symptoms and he has not been using them\par -xray on 2020 showed mild dextroscoliosis\par -on physical exam today, mild dextroscoliosis noted, with mild R shoulder elevation in comparison to left\par -advised patient need for Xray of lumbar spine, ribs and scoliosis series- ordered, f/u results\par -patient provided with PT referral\par -encouraged patient to continue pain management with Tylenol and Ibuprofen, as well as back strengthening with PT\par -advised patient to return to clinic after PT and Xrays, in order to assess pain and further need for possible MRI and spine specialist referral\par \par #HCM\par -patient's wife with due date in approximately 1 month. Patient has not received Tdap in the last 20 years and states he does not believe wife has received the Tdap vaccine during her pregnancy. Encouraged patient to receive Tdap vaccine today, however, he wishes to discuss the decision with his wife. Advised patient on the need for the whooping cough vaccine to protect their . Patient understands the need for the vaccine and will call to schedule a follow up appointment for the vaccine\par -patient encouraged to eat low sodium diet and to continue with exercise, as he is concerned about high blood pressure

## 2021-08-25 NOTE — PHYSICAL EXAM
[No Acute Distress] : no acute distress [Well Nourished] : well nourished [Well Developed] : well developed [Well-Appearing] : well-appearing [Normal Sclera/Conjunctiva] : normal sclera/conjunctiva [No Respiratory Distress] : no respiratory distress  [No Accessory Muscle Use] : no accessory muscle use [Clear to Auscultation] : lungs were clear to auscultation bilaterally [Normal Rate] : normal rate  [Regular Rhythm] : with a regular rhythm [Normal S1, S2] : normal S1 and S2 [No Murmur] : no murmur heard [Soft] : abdomen soft [Non Tender] : non-tender [Non-distended] : non-distended [Normal Bowel Sounds] : normal bowel sounds [Coordination Grossly Intact] : coordination grossly intact [No Focal Deficits] : no focal deficits [Normal Gait] : normal gait [Normal Affect] : the affect was normal [Normal Insight/Judgement] : insight and judgment were intact [de-identified] : Mild dextroscoliosis noted on exam with mild R shoulder elevation compared to L. Mild spinal tenderness on palpation in L4-5. [de-identified] : Strength and sensation intact in bilateral upper and lower extremities

## 2021-08-25 NOTE — HISTORY OF PRESENT ILLNESS
[FreeTextEntry1] : Back and Rib pain [de-identified] : Patient is a 41 year old male with pmhx of asthma who presents for a 1.5 year hx of lumbar back pain as well as bilateral lower rib pain. Patient states the pain is intermittent and is often associated with certain positions, including laying supine, as well as occasionally when sitting up. Patient has been controlling his pain with Tylenol and occasionally Ibuprofen. Pt denies shortness of breath during exertion, but states the pain often "restricts movement of his chest," which then causes him to feel short of breath. Pt followed up with Pulm when his symptoms started, who attributed his shortness of breath to asthma. He was given inhalers, however, patient does not believe the inhalers have been helping and he does not believe his symptoms are due to asthma, so he has not been using them. Pt also followed up with Cardiology, who did an echo and CTA, which were normal. Patient had an xray done on 4/2020, which showed mild dextroscoliosis, which he believes is the cause of his pain. \par \par Patient denies further complaints and states he wishes to control his back pain because his wife is due with their baby in approximately one month.

## 2021-08-25 NOTE — REVIEW OF SYSTEMS
[Shortness Of Breath] : shortness of breath [Back Pain] : back pain [Fever] : no fever [Chills] : no chills [Night Sweats] : no night sweats [Recent Change In Weight] : ~T no recent weight change [Nasal Discharge] : no nasal discharge [Sore Throat] : no sore throat [Palpitations] : no palpitations [Lower Ext Edema] : no lower extremity edema [Paroxysmal Nocturnal Dyspnea] : no paroxysmal nocturnal dyspnea [Cough] : no cough [Dyspnea on Exertion] : not dyspnea on exertion [Abdominal Pain] : no abdominal pain [Nausea] : no nausea [Constipation] : no constipation [Diarrhea] : no diarrhea [Vomiting] : no vomiting [Melena] : no melena [Dysuria] : no dysuria [Frequency] : no frequency [Muscle Weakness] : no muscle weakness [Headache] : no headache [Dizziness] : no dizziness [Unsteady Walk] : no ataxia [FreeTextEntry6] : shortness of breath associated with rib/back pain [FreeTextEntry9] : +rib pain

## 2021-08-25 NOTE — END OF VISIT
[] : Resident [FreeTextEntry3] : NPA for acute complaint. \par Due for TDAP.  Wife in 3rd trimster. \par Pt will discuss with wife and come in for vaccine appt. \par Recommended COVID vaccine for wife as well. \par Back pain plan as above.  [Time Spent: ___ minutes] : I have spent [unfilled] minutes of time on the encounter.

## 2021-08-27 PROBLEM — S99.921A INJURY OF RIGHT TOE: Status: ACTIVE | Noted: 2021-08-27

## 2021-08-27 NOTE — PLAN
[FreeTextEntry1] : Pt presented acutely w/ R fifth toe numbness, pain and decreased ROM after injuring it the day before. Pain somewhat alleviated with ice and ibuprofen.\par #R fifth toe pain\par -toe likely broken but pt does not meet [facility] for urgent foot xray as pain localized to R fifth toe and there is low concern for mid-foot or ankle fracture\par -explained to pt that there are no acute interventions for broken toes aside from pain control and splinting/taping, and that pain will subside over time\par -educated pt on wearing sneakers (pt came to appt in flip flops) for better support, to facilitate equal weight distribution and to prevent further injury to the toe\par -taped fifth toe to fourth toe and provided pt w/ tape for him to use at home\par -pain control - 400-600mg ibuprofen 2-3x/day x1 week\par -f/u in clinic in 2 weeks if no improvement in pain, at which time xray and/or podiatry consult may be warranted

## 2021-08-27 NOTE — END OF VISIT
[] : Resident [FreeTextEntry3] : Right 5th toe injury, banged his foot on furniture, now mildly swollen, painful. Possibly small fracture. \par Taped. Take NSAID, ice /cool compress, wear sneakers for overall support or at least closed toe shoes to prevnet injury. \par RTO 2 wks PRN. May proceed to xray and podiatry if slow healing.  [Time Spent: ___ minutes] : I have spent [unfilled] minutes of time on the encounter.

## 2021-08-27 NOTE — HISTORY OF PRESENT ILLNESS
[FreeTextEntry8] : Patient was seen at [facility] on 8/23 for regular f/u appt, and presented today for acute visit after injuring fifth toe on Right foot. Believes he banged his toe against something yesterday (8/24) and has had pain, numbness, decreased ROM of the toe since the injury. Tried ice and ibuprofen yesterday, both of which helped a bit, but was still in significant pain today so decided to come into clinic for exam and evaluation. He has avoided putting weight on the lateral side of his foot and has been walking with a limp.

## 2021-08-27 NOTE — REVIEW OF SYSTEMS
[Back Pain] : back pain [Negative] : Constitutional [Joint Pain] : no joint pain [Joint Swelling] : no joint swelling [FreeTextEntry9] : +numbness, +pain of R fifth toe; +limp

## 2021-08-27 NOTE — PHYSICAL EXAM
[Normal] : no acute distress, well nourished, well developed and well-appearing [de-identified] : R toe swollen, numb but very tender; +pain w/ minimal passive toe extension; active ROM significantly decreased due to pain

## 2021-12-02 ENCOUNTER — APPOINTMENT (OUTPATIENT)
Dept: INTERNAL MEDICINE | Facility: CLINIC | Age: 41
End: 2021-12-02
Payer: MEDICAID

## 2021-12-02 VITALS
DIASTOLIC BLOOD PRESSURE: 86 MMHG | WEIGHT: 198 LBS | OXYGEN SATURATION: 99 % | TEMPERATURE: 98.6 F | SYSTOLIC BLOOD PRESSURE: 155 MMHG | HEART RATE: 80 BPM | BODY MASS INDEX: 23.38 KG/M2 | HEIGHT: 77 IN

## 2021-12-02 VITALS — SYSTOLIC BLOOD PRESSURE: 125 MMHG | DIASTOLIC BLOOD PRESSURE: 85 MMHG

## 2021-12-02 VITALS — DIASTOLIC BLOOD PRESSURE: 88 MMHG | SYSTOLIC BLOOD PRESSURE: 124 MMHG

## 2021-12-02 DIAGNOSIS — Z86.19 PERSONAL HISTORY OF OTHER INFECTIOUS AND PARASITIC DISEASES: ICD-10-CM

## 2021-12-02 DIAGNOSIS — B35.6 TINEA CRURIS: ICD-10-CM

## 2021-12-02 PROCEDURE — 99213 OFFICE O/P EST LOW 20 MIN: CPT | Mod: GC

## 2021-12-03 PROBLEM — Z86.19 HISTORY OF TINEA CORPORIS: Status: RESOLVED | Noted: 2021-12-02 | Resolved: 2021-12-03

## 2021-12-03 PROBLEM — B35.6 TINEA CRURIS: Status: ACTIVE | Noted: 2021-12-03

## 2021-12-03 NOTE — PHYSICAL EXAM
[Normal] : affect was normal and insight and judgment were intact [de-identified] : circular, well circumscribed, pruritic rash on upper inside of thigh all the way up to the groin.

## 2021-12-03 NOTE — ASSESSMENT
[FreeTextEntry1] : Patient is 42 yo M with a past medical history of asthma who presents with a slightly raised, circular, well circumscribed, and pruritic rash on the inside of both thighs.\par \par #Tinea cruris\par - Lamisil cream to be applied to affected area 2 to 3 times a day\par - Fluconazole 200 mg once a week for 4 weeks\par - Counseled on keeping area dry\par \par #Health maintenance\par - Continue to monitor blood pressure\par - Refused flu shot\par \par Return to clinic in 4 weeks if rash has not improved with treatment

## 2021-12-03 NOTE — HISTORY OF PRESENT ILLNESS
[FreeTextEntry1] : Rash [de-identified] : Patient is 40 yo M with a past medical history of asthma who presents with a rash on the inside of both thighs. The rash is slightly raised, circular, well circumscribed, and pruritic. The rash has been going on for the two to three months and appears to be getting worse.

## 2021-12-03 NOTE — END OF VISIT
[FreeTextEntry3] : I saw and evaluated the patient.  The findings and assessment were discussed with the resident and I agree with resident’s plan as documented in the above, in the resident’s note.\par \par Pertinent exam findings: Well-appearing, NAD. \par \par Tinea cruris: Severe.  Will treat w/ topical and oral agent.  Lamisil, diflucan weekly x4 weeks.  RTC if no improvement.  Counseled on keeping area dry and other ways to prevent from reoccurrence.

## 2022-04-26 NOTE — ED PROVIDER NOTE - CHIEF COMPLAINT
The patient is a 38y Male complaining of chest pain. HTN (hypertension) HTN (hypertension) HTN (hypertension)

## 2022-10-21 ENCOUNTER — APPOINTMENT (OUTPATIENT)
Dept: INTERNAL MEDICINE | Facility: CLINIC | Age: 42
End: 2022-10-21

## 2022-10-21 VITALS
BODY MASS INDEX: 23.14 KG/M2 | HEIGHT: 77 IN | OXYGEN SATURATION: 99 % | DIASTOLIC BLOOD PRESSURE: 89 MMHG | WEIGHT: 196 LBS | SYSTOLIC BLOOD PRESSURE: 139 MMHG | TEMPERATURE: 98.4 F | HEART RATE: 64 BPM

## 2022-10-21 DIAGNOSIS — H60.90 UNSPECIFIED OTITIS EXTERNA, UNSPECIFIED EAR: ICD-10-CM

## 2022-10-21 DIAGNOSIS — H92.09 OTALGIA, UNSPECIFIED EAR: ICD-10-CM

## 2022-10-21 PROCEDURE — 99213 OFFICE O/P EST LOW 20 MIN: CPT

## 2022-10-21 RX ORDER — FLUCONAZOLE 200 MG/1
200 TABLET ORAL
Qty: 4 | Refills: 0 | Status: DISCONTINUED | COMMUNITY
Start: 2021-12-02 | End: 2022-10-21

## 2022-10-21 RX ORDER — CIPROFLOXACIN 0.5 MG/.25ML
0.2 SOLUTION/ DROPS AURICULAR (OTIC) TWICE DAILY
Qty: 1 | Refills: 0 | Status: COMPLETED | COMMUNITY
Start: 2022-10-21

## 2022-10-21 RX ORDER — METOPROLOL SUCCINATE 50 MG/1
50 TABLET, EXTENDED RELEASE ORAL AT BEDTIME
Qty: 1 | Refills: 0 | Status: DISCONTINUED | COMMUNITY
Start: 2020-05-22 | End: 2022-10-21

## 2022-10-21 RX ORDER — TERBINAFINE HYDROCHLORIDE 10 MG/G
1 CREAM TOPICAL 3 TIMES DAILY
Qty: 3 | Refills: 0 | Status: DISCONTINUED | COMMUNITY
Start: 2021-12-02 | End: 2022-10-21

## 2022-10-21 RX ORDER — CIPROFLOXACIN 0.5 MG/.25ML
0.2 SOLUTION/ DROPS AURICULAR (OTIC)
Qty: 1 | Refills: 0 | Status: ACTIVE | COMMUNITY
Start: 2022-10-21 | End: 1900-01-01

## 2023-01-31 NOTE — ED ADULT NURSE NOTE - NS ED NURSE LEVEL OF CONSCIOUSNESS MENTAL STATUS
Cooperative/Alert/Awake Duration Of Freeze Thaw-Cycle (Seconds): 0 Post-Care Instructions: I reviewed with the patient in detail post-care instructions. Patient is to wear sunprotection, and avoid picking at any of the treated lesions. Pt may apply Vaseline to crusted or scabbing areas. Render Post-Care Instructions In Note?: no Detail Level: Simple Consent: The patient's consent was obtained including but not limited to risks of crusting, scabbing, blistering, scarring, darker or lighter pigmentary change, recurrence, incomplete removal and infection. Show Aperture Variable?: Yes

## 2023-08-18 NOTE — ED ADULT NURSE NOTE - [id number]_ED_A_ED
Patient was previously on Wellbutrin 300mg in Feb and was sent with 3 additional refill. Patient had been off of the wellbutrin and then was restarted 6/19 and prescription was sent for 150mg for one week then increase to 300mg. However the prescription that was sent 7/11 was for the same dose and directions and patient never received prescription for 300mg. Patient would like to know if she is supposed to be on the 150mg or 300mg. She has only been taking 150mg daily. No